# Patient Record
(demographics unavailable — no encounter records)

---

## 2024-11-06 NOTE — DATA REVIEWED
[MRI] : MRI [Lumbar Spine] : lumbar spine [Report was reviewed and noted in the chart] : The report was reviewed and noted in the chart [I independently reviewed and interpreted images and report] : I independently reviewed and interpreted images and report [FreeTextEntry1] : 7/21/2023 MRI Lumbar Spine O&C Modic 2 change at L1-L2, L5-S1. L1-L2: broad disc bulge, facet hypertrophy, ligamentum flavum hypertrophy L2-L3: broad disc bulge, facet hypertrophy, ligamentum flavum hypertrophy, inferior NF stenosis L3-L4: minor retrolisthesis, broad disc bulge, facet hypertrophy, ligamentum flavum hypertrophy w/ inferior RIGHT NF stenosis L4-L5: grade I anterolisthesis, broad disc bulge, facet hypertrophy, ligamentum flavum hypertrophy w/ inferior NF stenosis, central herniation impressing thecal sac causing moderate stenosis L5-S1: minor retrolisthesis, broad disc bulge, facet hypertrophy, ligamentum flavum hypertrophy w/ foraminal extension of bulges, inferior RIGHT NF stenosis, broad herniation impressing thecal sac making contact w/ bilateral S1 nerve roots

## 2024-11-06 NOTE — HISTORY OF PRESENT ILLNESS
[FreeTextEntry1] : 11/05/2024 JAVIER BIRD is a 67 year-old man presenting for a NPV for a history of chronic low back pain.    The patient states that average pain over the past week was /10 in severity. Mood: Sleep:   Prior treatment:

## 2024-11-06 NOTE — DISCUSSION/SUMMARY
[de-identified] : JAVIER BIRD is a 67 year-old man presenting for a NPV for a history of chronic low back pain.    Plan: 1) MRI lumbar spine images reviewed with the patient.

## 2024-12-09 NOTE — DATA REVIEWED
[MRI] : MRI [Lumbar Spine] : lumbar spine [I reviewed the films/CD and agree] : I reviewed the films/CD and agree [FreeTextEntry1] : extruded HNP L5/S1 central/right, stenosis L4/5

## 2024-12-09 NOTE — HISTORY OF PRESENT ILLNESS
[Lower back] : lower back [Gradual] : gradual [Sudden] : sudden [6] : 6 [Burning] : burning [Shooting] : shooting [Stabbing] : stabbing [Intermittent] : intermittent [Rest] : rest [Exercising] : exercising [Retired] : Work status: retired [de-identified] : 12/9/24: here to follow up on lumbar spine. Was doing well until recently. Went to [facility] recently, had MRI. Experiences radiating pain down right leg. Numbness in right foot. Received trigger point on 12/5/24. Currently taking MDP.  8/11/23- Had good response to MDP, has been back to the gym. Had MRI lumbar, has multilevel dessicated disc signal, grade 1 listhesis L4/5 with moderate stenosis  64 y/o M with atraumatic low back pain x weeks. c/o RLE numbness/tingling. denies bladder or bowel issues. Has not tried any interventions. denies DM.  [] : Post Surgical Visit: no [FreeTextEntry1] : l spine  [FreeTextEntry5] : Pt has had a gradual onset of l spine pain and numbness and tingling down into the right thigh with movement and worse when walking  [FreeTextEntry7] : down into the right leg  [de-identified] : lio

## 2024-12-10 NOTE — HISTORY OF PRESENT ILLNESS
[de-identified] : Dr. Farah note-  Was doing well until recently. Went to [facility] recently, had MRI. Experiences radiating pain down right leg. Numbness in right foot. Received trigger point on 12/5/24. Currently taking MDP. 8/11/23- Had good response to MDP, has been back to the gym. Had MRI lumbar, has multilevel dessicated disc signal, grade 1 listhesis L4/5 with moderate stenosis  7/21/2023 Lumbar MRI  - report noted in chart.   10/17/2024 [facility] Lumbar MRI  - report noted in chart.  Ind. 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 Similar symptoms down the LLE, that pain has improved since undergoing a LESI several weeks ago. Taking MDP now, is enrolled in PT. Has had intermittent pain down the LLE, new symptoms are down the RLE for the last several months, the R EHL weakness started about 12/3/2024.

## 2024-12-10 NOTE — ASSESSMENT
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

## 2024-12-10 NOTE — IMAGING
[de-identified] : LSPINE Inspection: No rash or ecchymosis Palpation: No tenderness to palpation or spasm in bilateral thoracic and lumbar paraspinal musculature, no SI joint tenderness to palpation ROM: Full with stiffness Strength: 5/5 LEFT hip flexors, knee extensors, ankle dorsiflexors, EHL, ankle plantarflexors; R EHL 0/5, otherwise 5/5 Sensation: Sensation present to light touch bilateral L2-S1 distributions, altered R dorsal and plantar foot as well as lateral leg Provocative maneuvers: Negative bilateral straight leg raise  Bilateral hips- Palpation: No tenderness to palpation over greater trochanter or IT band ROM: No pain with flexion and internal rotation [Facet arthropathy] : Facet arthropathy [Disc space narrowing] : Disc space narrowing [Spondylolithesis] : Spondylolithesis [AP] : anteroposterior [There are no fractures, subluxations or dislocations. No significant abnormalities are seen] : There are no fractures, subluxations or dislocations. No significant abnormalities are seen